# Patient Record
Sex: FEMALE | Race: BLACK OR AFRICAN AMERICAN
[De-identification: names, ages, dates, MRNs, and addresses within clinical notes are randomized per-mention and may not be internally consistent; named-entity substitution may affect disease eponyms.]

---

## 2019-10-04 ENCOUNTER — HOSPITAL ENCOUNTER (EMERGENCY)
Dept: HOSPITAL 62 - ER | Age: 49
Discharge: HOME | End: 2019-10-04
Payer: MEDICAID

## 2019-10-04 VITALS — SYSTOLIC BLOOD PRESSURE: 146 MMHG | DIASTOLIC BLOOD PRESSURE: 100 MMHG

## 2019-10-04 DIAGNOSIS — M54.6: ICD-10-CM

## 2019-10-04 DIAGNOSIS — Z79.899: ICD-10-CM

## 2019-10-04 DIAGNOSIS — E11.9: ICD-10-CM

## 2019-10-04 DIAGNOSIS — I10: ICD-10-CM

## 2019-10-04 DIAGNOSIS — J06.9: Primary | ICD-10-CM

## 2019-10-04 LAB
ADD MANUAL DIFF: NO
ALBUMIN SERPL-MCNC: 4.3 G/DL (ref 3.5–5)
ALP SERPL-CCNC: 49 U/L (ref 38–126)
ANION GAP SERPL CALC-SCNC: 9 MMOL/L (ref 5–19)
AST SERPL-CCNC: 19 U/L (ref 14–36)
BASOPHILS # BLD AUTO: 0 10^3/UL (ref 0–0.2)
BASOPHILS NFR BLD AUTO: 0.7 % (ref 0–2)
BILIRUB DIRECT SERPL-MCNC: 0.1 MG/DL (ref 0–0.4)
BILIRUB SERPL-MCNC: 0.3 MG/DL (ref 0.2–1.3)
BUN SERPL-MCNC: 6 MG/DL (ref 7–20)
CALCIUM: 9.7 MG/DL (ref 8.4–10.2)
CHLORIDE SERPL-SCNC: 105 MMOL/L (ref 98–107)
CO2 SERPL-SCNC: 25 MMOL/L (ref 22–30)
EOSINOPHIL # BLD AUTO: 0 10^3/UL (ref 0–0.6)
EOSINOPHIL NFR BLD AUTO: 0.6 % (ref 0–6)
ERYTHROCYTE [DISTWIDTH] IN BLOOD BY AUTOMATED COUNT: 18.9 % (ref 11.5–14)
GLUCOSE SERPL-MCNC: 91 MG/DL (ref 75–110)
HCT VFR BLD CALC: 29.1 % (ref 36–47)
HGB BLD-MCNC: 9.1 G/DL (ref 12–15.5)
LYMPHOCYTES # BLD AUTO: 1.6 10^3/UL (ref 0.5–4.7)
LYMPHOCYTES NFR BLD AUTO: 31.7 % (ref 13–45)
MCH RBC QN AUTO: 22.8 PG (ref 27–33.4)
MCHC RBC AUTO-ENTMCNC: 31.3 G/DL (ref 32–36)
MCV RBC AUTO: 73 FL (ref 80–97)
MONOCYTES # BLD AUTO: 0.5 10^3/UL (ref 0.1–1.4)
MONOCYTES NFR BLD AUTO: 8.9 % (ref 3–13)
NEUTROPHILS # BLD AUTO: 3 10^3/UL (ref 1.7–8.2)
NEUTS SEG NFR BLD AUTO: 58.1 % (ref 42–78)
PLATELET # BLD: 305 10^3/UL (ref 150–450)
POTASSIUM SERPL-SCNC: 4.2 MMOL/L (ref 3.6–5)
PROT SERPL-MCNC: 8.4 G/DL (ref 6.3–8.2)
RBC # BLD AUTO: 3.99 10^6/UL (ref 3.72–5.28)
TOTAL CELLS COUNTED % (AUTO): 100 %
WBC # BLD AUTO: 5.2 10^3/UL (ref 4–10.5)

## 2019-10-04 PROCEDURE — 93010 ELECTROCARDIOGRAM REPORT: CPT

## 2019-10-04 PROCEDURE — 99284 EMERGENCY DEPT VISIT MOD MDM: CPT

## 2019-10-04 PROCEDURE — 93005 ELECTROCARDIOGRAM TRACING: CPT

## 2019-10-04 PROCEDURE — 85025 COMPLETE CBC W/AUTO DIFF WBC: CPT

## 2019-10-04 PROCEDURE — 80053 COMPREHEN METABOLIC PANEL: CPT

## 2019-10-04 PROCEDURE — 84484 ASSAY OF TROPONIN QUANT: CPT

## 2019-10-04 PROCEDURE — 71046 X-RAY EXAM CHEST 2 VIEWS: CPT

## 2019-10-04 PROCEDURE — 36415 COLL VENOUS BLD VENIPUNCTURE: CPT

## 2019-10-04 NOTE — EKG REPORT
SEVERITY:- ABNORMAL ECG -

SINUS RHYTHM

LEFT VENTRICULAR HYPERTROPHY

:

Confirmed by: Emilia Petit MD 04-Oct-2019 13:38:31

## 2019-10-04 NOTE — RADIOLOGY REPORT (SQ)
EXAM DESCRIPTION:  CHEST 2 VIEWS



COMPLETED DATE/TIME:  10/4/2019 9:03 am



REASON FOR STUDY:  cough



COMPARISON:  None.



EXAM PARAMETERS:  NUMBER OF VIEWS: two views

TECHNIQUE: Digital Frontal and Lateral radiographic views of the chest acquired.

RADIATION DOSE: NA

LIMITATIONS: none



FINDINGS:  LUNGS AND PLEURA: No opacities, masses or pneumothorax. No pleural effusion.

MEDIASTINUM AND HILAR STRUCTURES: No masses or contour abnormalities.

HEART AND VASCULAR STRUCTURES: Heart normal size.  No evidence for failure.

BONES: No acute findings.

HARDWARE: None in the chest.

OTHER: No other significant finding.



IMPRESSION:  No focal consolidation or other evidence of acute cardiopulmonary process.



TECHNICAL DOCUMENTATION:  JOB ID:  [id number]

 2011 [employer]- All Rights Reserved



Reading location - IP/workstation name: [email or web address]

## 2019-10-04 NOTE — ER DOCUMENT REPORT
ED Blood Pressure Problem





- General


Chief Complaint: High Blood Pressure


Stated Complaint: BLOOD PRESSURE ISSUES


Time Seen by Provider: 10/04/19 08:09


Mode of Arrival: Ambulatory


Information source: Patient


Notes: 





Patient presents concerned that her blood pressure has been elevated over the pa st week.  Patient has been adjusting her blood pressure medications due to her 

increased blood pressure readings at home.  Patient typically takes losartan 50 

mg daily and went to an urgent care who advised her to take her blood pressure 

medicine twice a day to treat her high blood pressure.  Patient states that 

yesterday she took her medicine 4 times in 1 day to help bring it down.  Patient

denies any chest pain.  Patient does complain of upper back tenderness.  Patient

states she initially had a headache but that resolved at this time.  Patient 

states she has had a cold but has not been taking any over-the-counter cough 

medications


TRAVEL OUTSIDE OF THE [place] IN LAST 30 DAYS: No





- HPI


Onset: Last week


Onset/Duration: Persistent


Quality of pain: Achy


Pain Level: 3


Associated symptoms: Other - Upper back pain.  denies: Chest pain, Nausea, 

Vomiting


Similar symptoms previously: No


Recently seen / treated by doctor: Yes





- Related Data


Allergies/Adverse Reactions: 


                                        





No Known Allergies Allergy (Verified 10/04/19 07:51)


   











Past Medical History





- General


Information source: Patient





- Social History


Smoking Status: Never Smoker


Chew tobacco use (# tins/day): Yes - dip


Frequency of alcohol use: Occasional


Drug Abuse: None


Lives with: Family


Family History: Reviewed & Not Pertinent


Patient has suicidal ideation: No


Patient has homicidal ideation: No





- Past Medical History


Cardiac Medical History: Reports: Hx Hypertension


Endocrine Medical History: Reports: Hx Diabetes Mellitus Type 2


GI Medical History: Reports: Hx Gastroesophageal Reflux Disease


Surgical Hx: Negative





Review of Systems





- Review of Systems


Constitutional: No symptoms reported.  denies: Fever


EENT: Nose congestion


Cardiovascular: No symptoms reported.  denies: Chest pain


Respiratory: Cough.  denies: Short of breath


Gastrointestinal: No symptoms reported.  denies: Abdominal pain, Nausea, 

Vomiting


Genitourinary: No symptoms reported


Female Genitourinary: No symptoms reported


Musculoskeletal: Muscle pain - Upper back pain


Skin: No symptoms reported


Hematologic/Lymphatic: No symptoms reported


Neurological/Psychological: No symptoms reported





Physical Exam





- Vital signs


Vitals: 


                                        











Temp Pulse Resp BP Pulse Ox


 


 98.7 F   93   18   176/99 H  100 


 


 10/04/19 07:43  10/04/19 07:43  10/04/19 07:43  10/04/19 07:43  10/04/19 07:43














- General


General appearance: Appears well, Alert


In distress: None





- HEENT


Head: Normocephalic, Atraumatic


Eyes: Normal


Conjunctiva: Normal


Nasal: Normal


Mouth/Lips: Normal


Mucous membranes: Normal


Neck: Normal, Supple.  No: Lymphadenopathy





- Respiratory


Respiratory status: No respiratory distress


Chest status: Nontender


Breath sounds: Nonproductive cough.  No: Rales, Rhonchi, Stridor, Wheezing


Chest palpation: Normal





- Cardiovascular


Rhythm: Regular


Heart sounds: S1 appreciated, S2 appreciated


Murmur: No





- Abdominal


Inspection: Normal


Distension: No distension


Tenderness: Nontender





- Back


Back: Tender - Bilateral trapezius muscle tenderness.  No: Vertebra tenderness





- Extremities


General upper extremity: Normal inspection, Nontender, Normal ROM


General lower extremity: Normal inspection, Nontender, Normal ROM





- Neurological


Neuro grossly intact: Yes


Cognition: Normal


Priscilla Coma Scale Eye Opening: Spontaneous


Priscilla Coma Scale Verbal: Oriented


[place] Coma Scale Motor: Obeys Commands


Priscilla Coma Scale Total: 15





- Psychological


Associated symptoms: Normal affect, Normal mood





- Skin


Skin Temperature: Warm


Skin Moisture: Dry


Skin Color: Normal





Course





- Re-evaluation


Re-evalutation: 





10/04/19 12:08


Patient blood pressure 150/108 at this time.  Patient denies any back pain 

symptoms.  Consult with Dr. Gonzalez who recommends adding on Norvasc 10 mg in 

addition to her 100 mg/day losartan and advises f/u with pcp


10/04/19 13:26


Patient with no elevation in delta troponin at this time.  Patient reports pain 

symptoms are improved.  Patient stable for discharge at this time good return 

precautions discussed.





- Vital Signs


Vital signs: 


                                        











Temp Pulse Resp BP Pulse Ox


 


 98.7 F   93   19   146/100 H  98 


 


 10/04/19 07:43  10/04/19 07:43  10/04/19 13:01  10/04/19 13:01  10/04/19 13:01














- Laboratory


Result Diagrams: 


                                 10/04/19 09:15





                                 10/04/19 09:15


Laboratory results interpreted by me: 


                                        











  10/04/19 10/04/19





  09:15 09:15


 


Hgb  9.1 L 


 


Hct  29.1 L 


 


MCV  73 L 


 


MCH  22.8 L 


 


MCHC  31.3 L 


 


RDW  18.9 H 


 


BUN   6 L


 


Total Protein   8.4 H











                               Labs- Entire Visit











  10/04/19 10/04/19 10/04/19





  09:15 09:15 09:15


 


WBC  5.2  


 


RBC  3.99  


 


Hgb  9.1 L  


 


Hct  29.1 L  


 


MCV  73 L  


 


MCH  22.8 L  


 


MCHC  31.3 L  


 


RDW  18.9 H  


 


Plt Count  305  


 


Lymph % (Auto)  31.7  


 


Mono % (Auto)  8.9  


 


Eos % (Auto)  0.6  


 


Baso % (Auto)  0.7  


 


Absolute Neuts (auto)  3.0  


 


Absolute Lymphs (auto)  1.6  


 


Absolute Monos (auto)  0.5  


 


Absolute Eos (auto)  0.0  


 


Absolute Basos (auto)  0.0  


 


Seg Neutrophils %  58.1  


 


Sodium   139.3 


 


Potassium   4.2 


 


Chloride   105 


 


Carbon Dioxide   25 


 


Anion Gap   9 


 


BUN   6 L 


 


Creatinine   0.94 


 


Est GFR ( Amer)   > 60 


 


Est GFR (MDRD) Non-Af   > 60 


 


Glucose   91 


 


Calcium   9.7 


 


Total Bilirubin   0.3 


 


Direct Bilirubin   0.1 


 


Neonat Total Bilirubin   Not Reportable 


 


Neonat Direct Bilirubin   Not Reportable 


 


Neonat Indirect Bili   Not Reportable 


 


AST   19 


 


ALT   10 


 


Alkaline Phosphatase   49 


 


Troponin I    < 0.012


 


Total Protein   8.4 H 


 


Albumin   4.3 














  10/04/19





  11:55


 


WBC 


 


RBC 


 


Hgb 


 


Hct 


 


MCV 


 


MCH 


 


MCHC 


 


RDW 


 


Plt Count 


 


Lymph % (Auto) 


 


Mono % (Auto) 


 


Eos % (Auto) 


 


Baso % (Auto) 


 


Absolute Neuts (auto) 


 


Absolute Lymphs (auto) 


 


Absolute Monos (auto) 


 


Absolute Eos (auto) 


 


Absolute Basos (auto) 


 


Seg Neutrophils % 


 


Sodium 


 


Potassium 


 


Chloride 


 


Carbon Dioxide 


 


Anion Gap 


 


BUN 


 


Creatinine 


 


Est GFR ( Amer) 


 


Est GFR (MDRD) Non-Af 


 


Glucose 


 


Calcium 


 


Total Bilirubin 


 


Direct Bilirubin 


 


Neonat Total Bilirubin 


 


Neonat Direct Bilirubin 


 


Neonat Indirect Bili 


 


AST 


 


ALT 


 


Alkaline Phosphatase 


 


Troponin I  < 0.012


 


Total Protein 


 


Albumin 














- Diagnostic Test


Radiology reviewed: Reports reviewed





- EKG Interpretation by Me


EKG shows normal: Sinus rhythm


Rate: Normal


Additional EKG results interpreted by me: 





10/04/19 13:13


no ST elevation, QTc 433





Discharge





- Discharge


Clinical Impression: 


 Elevated blood pressure reading, Upper back pain





Upper respiratory infection


Qualifiers:


 URI type: unspecified URI Qualified Code(s): J06.9 - Acute upper respiratory 

infection, unspecified





Condition: Stable


Disposition: HOME, SELF-CARE


Instructions:  Calcium Channel Blockers ([facility]), High Blood Pressure ([facility]), Muscle

Relaxers ([facility]), Muscle Strain ([facility]), Upper Respiratory Illness ([facility])


Additional Instructions: 


Return immediately for any new or worsening symptoms





Followup with your primary care provider, call tomorrow to make a followup ap

pointment





Only take your losartan as prescribed, do not take beyond take more than is 

recommended by your provider





Monitor your blood pressure daily and keep a log to present to your doctor


Prescriptions: 


Amlodipine Besylate [Norvasc 10 mg Tablet] 10 mg PO DAILY #15 tablet


Methocarbamol [Robaxin 500 Mg Tablet] 500 mg PO QID PRN #16 tablet


 PRN Reason: